# Patient Record
Sex: MALE | Race: WHITE | NOT HISPANIC OR LATINO
[De-identification: names, ages, dates, MRNs, and addresses within clinical notes are randomized per-mention and may not be internally consistent; named-entity substitution may affect disease eponyms.]

---

## 2017-06-14 ENCOUNTER — CHARTING TRANS (OUTPATIENT)
Dept: OTHER | Age: 72
End: 2017-06-14

## 2018-11-03 VITALS
HEART RATE: 75 BPM | TEMPERATURE: 98.3 F | SYSTOLIC BLOOD PRESSURE: 140 MMHG | DIASTOLIC BLOOD PRESSURE: 88 MMHG | WEIGHT: 190 LBS | RESPIRATION RATE: 16 BRPM | BODY MASS INDEX: 28.79 KG/M2 | HEIGHT: 68 IN

## 2019-02-26 ENCOUNTER — HOSPITAL (OUTPATIENT)
Dept: OTHER | Age: 74
End: 2019-02-26
Attending: INTERNAL MEDICINE

## 2019-03-04 ENCOUNTER — HOSPITAL ENCOUNTER (INPATIENT)
Facility: HOSPITAL | Age: 74
LOS: 5 days | Discharge: HOME | DRG: 683 | End: 2019-03-09
Attending: EMERGENCY MEDICINE | Admitting: HOSPITALIST
Payer: MEDICARE

## 2019-03-04 ENCOUNTER — APPOINTMENT (OUTPATIENT)
Dept: CT IMAGING | Facility: HOSPITAL | Age: 74
DRG: 683 | End: 2019-03-04
Attending: EMERGENCY MEDICINE
Payer: MEDICARE

## 2019-03-04 DIAGNOSIS — I10 HYPERTENSION, UNSPECIFIED TYPE: Primary | ICD-10-CM

## 2019-03-04 DIAGNOSIS — R33.9 URINARY RETENTION: ICD-10-CM

## 2019-03-04 DIAGNOSIS — R11.0 NAUSEA: ICD-10-CM

## 2019-03-04 DIAGNOSIS — N17.9 AKI (ACUTE KIDNEY INJURY) (HCC): ICD-10-CM

## 2019-03-04 DIAGNOSIS — E83.52 HYPERCALCEMIA: ICD-10-CM

## 2019-03-04 PROBLEM — N13.9 OBSTRUCTIVE UROPATHY: Status: ACTIVE | Noted: 2019-03-04

## 2019-03-04 LAB
ALBUMIN SERPL-MCNC: 3.8 G/DL (ref 3.4–5)
ALP LIVER SERPL-CCNC: 66 U/L (ref 45–117)
ALT SERPL-CCNC: 16 U/L (ref 16–61)
ANION GAP SERPL CALC-SCNC: 12 MMOL/L (ref 0–18)
AST SERPL-CCNC: 10 U/L (ref 15–37)
BACTERIA UR QL AUTO: NEGATIVE /HPF
BASOPHILS # BLD AUTO: 0.08 X10(3) UL (ref 0–0.2)
BASOPHILS NFR BLD AUTO: 0.7 %
BILIRUB DIRECT SERPL-MCNC: <0.1 MG/DL (ref 0–0.2)
BILIRUB SERPL-MCNC: 0.4 MG/DL (ref 0.1–2)
BILIRUB UR QL: NEGATIVE
BILIRUB UR QL: NEGATIVE
BUN BLD-MCNC: 84 MG/DL (ref 7–18)
BUN/CREAT SERPL: 11.3 (ref 10–20)
CALCIUM BLD-MCNC: 13.3 MG/DL (ref 8.5–10.1)
CHLORIDE SERPL-SCNC: 111 MMOL/L (ref 98–107)
CLARITY UR: CLEAR
CLARITY UR: CLEAR
CO2 SERPL-SCNC: 21 MMOL/L (ref 21–32)
COLOR UR: YELLOW
CREAT BLD-MCNC: 7.45 MG/DL (ref 0.7–1.3)
DEPRECATED RDW RBC AUTO: 45.6 FL (ref 35.1–46.3)
EOSINOPHIL # BLD AUTO: 0.09 X10(3) UL (ref 0–0.7)
EOSINOPHIL NFR BLD AUTO: 0.8 %
ERYTHROCYTE [DISTWIDTH] IN BLOOD BY AUTOMATED COUNT: 14 % (ref 11–15)
GLUCOSE BLD-MCNC: 118 MG/DL (ref 70–99)
GLUCOSE UR-MCNC: 50 MG/DL
GLUCOSE UR-MCNC: NEGATIVE MG/DL
HCT VFR BLD AUTO: 33.8 % (ref 39–53)
HGB BLD-MCNC: 11.6 G/DL (ref 13–17.5)
HGB UR QL STRIP.AUTO: NEGATIVE
IMM GRANULOCYTES # BLD AUTO: 0.04 X10(3) UL (ref 0–1)
IMM GRANULOCYTES NFR BLD: 0.4 %
KETONES UR-MCNC: NEGATIVE MG/DL
KETONES UR-MCNC: NEGATIVE MG/DL
LEUKOCYTE ESTERASE UR QL STRIP.AUTO: NEGATIVE
LEUKOCYTE ESTERASE UR QL STRIP.AUTO: NEGATIVE
LIPASE SERPL-CCNC: 359 U/L (ref 73–393)
LYMPHOCYTES # BLD AUTO: 1.44 X10(3) UL (ref 1–4)
LYMPHOCYTES NFR BLD AUTO: 13.3 %
M PROTEIN MFR SERPL ELPH: 8.3 G/DL (ref 6.4–8.2)
MCH RBC QN AUTO: 30.9 PG (ref 26–34)
MCHC RBC AUTO-ENTMCNC: 34.3 G/DL (ref 31–37)
MCV RBC AUTO: 90.1 FL (ref 80–100)
MONOCYTES # BLD AUTO: 0.85 X10(3) UL (ref 0.1–1)
MONOCYTES NFR BLD AUTO: 7.8 %
NEUTROPHILS # BLD AUTO: 8.34 X10 (3) UL (ref 1.5–7.7)
NEUTROPHILS # BLD AUTO: 8.34 X10(3) UL (ref 1.5–7.7)
NEUTROPHILS NFR BLD AUTO: 77 %
NITRITE UR QL STRIP.AUTO: NEGATIVE
NITRITE UR QL STRIP.AUTO: NEGATIVE
OSMOLALITY SERPL CALC.SUM OF ELEC: 325 MOSM/KG (ref 275–295)
PH UR: 5 [PH] (ref 5–8)
PH UR: 7 [PH] (ref 5–8)
PLATELET # BLD AUTO: 337 10(3)UL (ref 150–450)
POTASSIUM SERPL-SCNC: 4.6 MMOL/L (ref 3.5–5.1)
PROT UR-MCNC: 352.2 MG/DL
PROT UR-MCNC: >=500 MG/DL
PROT UR-MCNC: NEGATIVE MG/DL
RBC # BLD AUTO: 3.75 X10(6)UL (ref 3.8–5.8)
RBC #/AREA URNS AUTO: ABNORMAL /HPF
SODIUM SERPL-SCNC: 144 MMOL/L (ref 136–145)
SP GR UR STRIP: 1.01 (ref 1–1.03)
SP GR UR STRIP: 1.01 (ref 1–1.03)
UROBILINOGEN UR STRIP-ACNC: <2
UROBILINOGEN UR STRIP-ACNC: <2
VIT C UR-MCNC: NEGATIVE MG/DL
VIT C UR-MCNC: NEGATIVE MG/DL
WBC # BLD AUTO: 10.8 X10(3) UL (ref 4–11)
WBC #/AREA URNS AUTO: 0 /HPF

## 2019-03-04 PROCEDURE — 99223 1ST HOSP IP/OBS HIGH 75: CPT | Performed by: HOSPITALIST

## 2019-03-04 PROCEDURE — 74176 CT ABD & PELVIS W/O CONTRAST: CPT | Performed by: EMERGENCY MEDICINE

## 2019-03-04 RX ORDER — ONDANSETRON 2 MG/ML
4 INJECTION INTRAMUSCULAR; INTRAVENOUS EVERY 6 HOURS PRN
Status: DISCONTINUED | OUTPATIENT
Start: 2019-03-04 | End: 2019-03-09

## 2019-03-04 RX ORDER — ACETAMINOPHEN 325 MG/1
650 TABLET ORAL EVERY 6 HOURS PRN
Status: DISCONTINUED | OUTPATIENT
Start: 2019-03-04 | End: 2019-03-09

## 2019-03-04 RX ORDER — FINASTERIDE 5 MG/1
5 TABLET, FILM COATED ORAL DAILY
Status: DISCONTINUED | OUTPATIENT
Start: 2019-03-04 | End: 2019-03-09

## 2019-03-04 RX ORDER — AMLODIPINE BESYLATE 10 MG/1
10 TABLET ORAL DAILY
Status: DISCONTINUED | OUTPATIENT
Start: 2019-03-04 | End: 2019-03-09

## 2019-03-04 RX ORDER — CIPROFLOXACIN 2 MG/ML
400 INJECTION, SOLUTION INTRAVENOUS EVERY 12 HOURS
Status: DISCONTINUED | OUTPATIENT
Start: 2019-03-04 | End: 2019-03-04

## 2019-03-04 RX ORDER — SODIUM CHLORIDE 9 MG/ML
INJECTION, SOLUTION INTRAVENOUS CONTINUOUS
Status: DISCONTINUED | OUTPATIENT
Start: 2019-03-04 | End: 2019-03-05

## 2019-03-04 RX ORDER — AMLODIPINE BESYLATE 5 MG/1
5 TABLET ORAL DAILY
Status: DISCONTINUED | OUTPATIENT
Start: 2019-03-04 | End: 2019-03-04

## 2019-03-04 RX ORDER — SODIUM CHLORIDE 0.9 % (FLUSH) 0.9 %
3 SYRINGE (ML) INJECTION AS NEEDED
Status: DISCONTINUED | OUTPATIENT
Start: 2019-03-04 | End: 2019-03-09

## 2019-03-04 RX ORDER — SODIUM CHLORIDE 9 MG/ML
INJECTION, SOLUTION INTRAVENOUS CONTINUOUS
Status: DISCONTINUED | OUTPATIENT
Start: 2019-03-04 | End: 2019-03-09

## 2019-03-04 RX ORDER — TERAZOSIN 2 MG/1
2 CAPSULE ORAL NIGHTLY
Status: DISCONTINUED | OUTPATIENT
Start: 2019-03-04 | End: 2019-03-04

## 2019-03-04 RX ORDER — SODIUM CHLORIDE 9 MG/ML
INJECTION, SOLUTION INTRAVENOUS
Status: DISPENSED
Start: 2019-03-04 | End: 2019-03-05

## 2019-03-04 RX ORDER — LABETALOL HYDROCHLORIDE 5 MG/ML
10 INJECTION, SOLUTION INTRAVENOUS ONCE
Status: COMPLETED | OUTPATIENT
Start: 2019-03-04 | End: 2019-03-04

## 2019-03-04 RX ORDER — ONDANSETRON 2 MG/ML
4 INJECTION INTRAMUSCULAR; INTRAVENOUS ONCE
Status: COMPLETED | OUTPATIENT
Start: 2019-03-04 | End: 2019-03-04

## 2019-03-04 RX ORDER — PYRIDOXINE HCL (VITAMIN B6) 100 MG
TABLET ORAL
Status: ON HOLD | COMMUNITY
End: 2019-03-09

## 2019-03-04 RX ORDER — HYDRALAZINE HYDROCHLORIDE 20 MG/ML
10 INJECTION INTRAMUSCULAR; INTRAVENOUS ONCE
Status: COMPLETED | OUTPATIENT
Start: 2019-03-04 | End: 2019-03-04

## 2019-03-04 RX ORDER — ALFUZOSIN HYDROCHLORIDE 10 MG/1
10 TABLET, EXTENDED RELEASE ORAL
Status: DISCONTINUED | OUTPATIENT
Start: 2019-03-05 | End: 2019-03-07

## 2019-03-04 RX ORDER — HYDRALAZINE HYDROCHLORIDE 20 MG/ML
INJECTION INTRAMUSCULAR; INTRAVENOUS
Status: COMPLETED
Start: 2019-03-04 | End: 2019-03-04

## 2019-03-04 RX ORDER — HYDRALAZINE HYDROCHLORIDE 20 MG/ML
20 INJECTION INTRAMUSCULAR; INTRAVENOUS EVERY 6 HOURS PRN
Status: DISCONTINUED | OUTPATIENT
Start: 2019-03-04 | End: 2019-03-09

## 2019-03-04 RX ORDER — CIPROFLOXACIN 2 MG/ML
400 INJECTION, SOLUTION INTRAVENOUS EVERY 24 HOURS
Status: DISCONTINUED | OUTPATIENT
Start: 2019-03-04 | End: 2019-03-05

## 2019-03-04 RX ORDER — AMLODIPINE BESYLATE 5 MG/1
5 TABLET ORAL DAILY
Status: CANCELLED | OUTPATIENT
Start: 2019-03-04

## 2019-03-04 NOTE — CONSULTS
[facility] HOSP - [facility]    Report of Consultation    Marquis Sevilla Patient Status:  Emergency    1945 MRN [id number]   Location [address] Attending Tamica Yeh MD   Hosp Day # 0 PCP Ene Melgar     Date Summary (Last 24 hours) at 3/4/2019 1450  Last data filed at 3/4/2019 1405  Gross per 24 hour   Intake —   Output 1200 ml   Net -1200 ml     Wt Readings from Last 1 Encounters:  03/04/19 : 195 lb 1.7 oz (88.5 kg)      Exam  Gen: No acute distress  Heent: N at 13:47     Approved by (CST): Joseph Toribio MD on 3/04/2019 at 13:56                    Impression/Receommendations:     1 - YEMI  Due to NSAIDs and hydro  Also a component of hypercalcemia induced YEMI  Continue IVF    2 - Bilateral hydro  Dr Ginger Rothman cons

## 2019-03-04 NOTE — PROGRESS NOTES
In obtaining history from Mr. Marek Baez, he mentioned that he does drink 2 drinks/day, but states that he hasn't had any drinks in the last two weeks.  He also states that he does take some type of calcium supplement, on top of a multivitamin, but wasn't

## 2019-03-04 NOTE — ED INITIAL ASSESSMENT (HPI)
Patient here with c/o nausea and intermittent lower mid abdominal pain x 3 days. States recently had blood work done with elevated kidney function and US showing enlarged prostate.

## 2019-03-04 NOTE — PLAN OF CARE
GENITOURINARY - ADULT    • Absence of urinary retention Not Progressing          CARDIOVASCULAR - ADULT    • Maintains optimal cardiac output and hemodynamic stability Progressing        HEMATOLOGIC - ADULT    • Free from bleeding injury Progressing

## 2019-03-04 NOTE — H&P
[facility]    PATIENT'S NAME: Shelly Hernandez   ATTENDING PHYSICIAN: Tennille Leroy MD   PATIENT ACCOUNT#:   [id number]    LOCATION:  [place] [address]  MEDICAL RECORD #:   [id number]       YOB: 1945  ADMISSION DATE:       03/ today. Other 12-point review of system is negative. PHYSICAL EXAMINATION:    GENERAL:  Alert, oriented to time, place, and person. Moderate distress. VITAL SIGNS:  Temperature 98.6, pulse 83, respiratory rate 16, blood pressure 168/111.   Pulse ox

## 2019-03-04 NOTE — ED NOTES
Willard catheter clamped per dr Fredo Quach request. Megan Davies by dr Fredo Quach to keep clamped until patient is admitted to the floor.

## 2019-03-04 NOTE — PROGRESS NOTES
[facility] Pharmacy Note:  Renal Adjustment for ciprofloxacin (CIPRO)    Tyra Amaral is a 75year old male who has been prescribed ciprofloxacin (CIPRO) 400 mg every 12 hrs.   CrCl is estimated creatinine clearance is 8.1 mL/min (A) (based on SCr of 7.45 m

## 2019-03-05 LAB
ALBUMIN SERPL-MCNC: 3.2 G/DL (ref 3.4–5)
ANION GAP SERPL CALC-SCNC: 12 MMOL/L (ref 0–18)
BASOPHILS # BLD AUTO: 0.07 X10(3) UL (ref 0–0.2)
BASOPHILS NFR BLD AUTO: 0.5 %
BUN BLD-MCNC: 80 MG/DL (ref 7–18)
BUN/CREAT SERPL: 11.7 (ref 10–20)
CALCIUM BLD-MCNC: 11.3 MG/DL (ref 8.5–10.1)
CHLORIDE SERPL-SCNC: 112 MMOL/L (ref 98–107)
CO2 SERPL-SCNC: 19 MMOL/L (ref 21–32)
COMPLEXED PSA SERPL-MCNC: 182 NG/ML (ref ?–4)
CREAT BLD-MCNC: 6.81 MG/DL (ref 0.7–1.3)
DEPRECATED RDW RBC AUTO: 47.8 FL (ref 35.1–46.3)
EOSINOPHIL # BLD AUTO: 0.14 X10(3) UL (ref 0–0.7)
EOSINOPHIL NFR BLD AUTO: 1.1 %
ERYTHROCYTE [DISTWIDTH] IN BLOOD BY AUTOMATED COUNT: 14.1 % (ref 11–15)
GLUCOSE BLD-MCNC: 113 MG/DL (ref 70–99)
HCT VFR BLD AUTO: 32.8 % (ref 39–53)
HGB BLD-MCNC: 10.9 G/DL (ref 13–17.5)
IMM GRANULOCYTES # BLD AUTO: 0.04 X10(3) UL (ref 0–1)
IMM GRANULOCYTES NFR BLD: 0.3 %
LYMPHOCYTES # BLD AUTO: 2.01 X10(3) UL (ref 1–4)
LYMPHOCYTES NFR BLD AUTO: 15.4 %
MCH RBC QN AUTO: 30.4 PG (ref 26–34)
MCHC RBC AUTO-ENTMCNC: 33.2 G/DL (ref 31–37)
MCV RBC AUTO: 91.4 FL (ref 80–100)
MONOCYTES # BLD AUTO: 1.19 X10(3) UL (ref 0.1–1)
MONOCYTES NFR BLD AUTO: 9.1 %
MRSA DNA SPEC QL NAA+PROBE: NEGATIVE
NEUTROPHILS # BLD AUTO: 9.58 X10 (3) UL (ref 1.5–7.7)
NEUTROPHILS # BLD AUTO: 9.58 X10(3) UL (ref 1.5–7.7)
NEUTROPHILS NFR BLD AUTO: 73.6 %
OSMOLALITY SERPL CALC.SUM OF ELEC: 321 MOSM/KG (ref 275–295)
PHOSPHATE SERPL-MCNC: 6.3 MG/DL (ref 2.5–4.9)
PLATELET # BLD AUTO: 342 10(3)UL (ref 150–450)
POTASSIUM SERPL-SCNC: 4.6 MMOL/L (ref 3.5–5.1)
PTH-INTACT SERPL-MCNC: 10.4 PG/ML (ref 18.5–88)
RBC # BLD AUTO: 3.59 X10(6)UL (ref 3.8–5.8)
SODIUM SERPL-SCNC: 143 MMOL/L (ref 136–145)
TSI SER-ACNC: 2.03 MIU/ML (ref 0.36–3.74)
VIT B12 SERPL-MCNC: 775 PG/ML (ref 193–986)
WBC # BLD AUTO: 13 X10(3) UL (ref 4–11)

## 2019-03-05 PROCEDURE — 99233 SBSQ HOSP IP/OBS HIGH 50: CPT | Performed by: HOSPITALIST

## 2019-03-05 RX ORDER — SODIUM CHLORIDE 9 MG/ML
INJECTION, SOLUTION INTRAVENOUS
Status: DISPENSED
Start: 2019-03-05 | End: 2019-03-06

## 2019-03-05 RX ORDER — SIMETHICONE 80 MG
80 TABLET,CHEWABLE ORAL 4 TIMES DAILY PRN
Status: DISCONTINUED | OUTPATIENT
Start: 2019-03-05 | End: 2019-03-09

## 2019-03-05 RX ORDER — PRAVASTATIN SODIUM 20 MG
20 TABLET ORAL NIGHTLY
Status: DISCONTINUED | OUTPATIENT
Start: 2019-03-05 | End: 2019-03-09

## 2019-03-05 RX ORDER — HEPARIN SODIUM 5000 [USP'U]/ML
5000 INJECTION, SOLUTION INTRAVENOUS; SUBCUTANEOUS EVERY 12 HOURS SCHEDULED
Status: DISCONTINUED | OUTPATIENT
Start: 2019-03-05 | End: 2019-03-08

## 2019-03-05 RX ORDER — ASPIRIN 81 MG/1
81 TABLET, CHEWABLE ORAL DAILY
Status: DISCONTINUED | OUTPATIENT
Start: 2019-03-06 | End: 2019-03-09

## 2019-03-05 RX ORDER — POLYETHYLENE GLYCOL 3350 17 G/17G
17 POWDER, FOR SOLUTION ORAL DAILY
Status: DISCONTINUED | OUTPATIENT
Start: 2019-03-05 | End: 2019-03-07

## 2019-03-05 RX ORDER — PAMIDRONATE DISODIUM 3 MG/ML
60 INJECTION, SOLUTION INTRAVENOUS ONCE
Status: DISCONTINUED | OUTPATIENT
Start: 2019-03-05 | End: 2019-03-05 | Stop reason: SDUPTHER

## 2019-03-05 RX ORDER — SENNA AND DOCUSATE SODIUM 50; 8.6 MG/1; MG/1
2 TABLET, FILM COATED ORAL
Status: DISCONTINUED | OUTPATIENT
Start: 2019-03-05 | End: 2019-03-09

## 2019-03-05 NOTE — PLAN OF CARE
METABOLIC/FLUID AND ELECTROLYTES - ADULT    • Electrolytes maintained within normal limits Not Progressing          Altered Communication/Language Barrier    • Patient/Family is able to understand and participate in their care Progressing        CARDIOVASC

## 2019-03-05 NOTE — PROGRESS NOTES
[facility]    Progress Note    Chelsi Washington Patient Status:  Inpatient    1945 MRN [id number]   Location [facility] 2W/SW Attending Emmett Jaramillo MD   Hosp Day # 1 PCP Rosales Leader       Subjective:   Andrade TORRES [facility] Normal Saline Flush, acetaminophen, ondansetron HCl, hydrALAzine HCl    Results:     Lab Results   Component Value Date    WBC 13.0 (H) 03/05/2019    HGB 10.9 (L) 03/05/2019    HCT 32.8 (L) 03/05/2019    .0 03/05/2019    CREATSERUM 6.81 (H) 03/ symmetric-appearing bilateral hydroureteronephrosis, but without visible obstructing calculus. Hydronephrosis may be related to outlet obstruction/vesicoureteral reflux or ascending urinary tract infection. Urology assessment should be considered.  2. Pro

## 2019-03-05 NOTE — PROGRESS NOTES
Paged Dr. Schilling Crooked in regards to urine bed red with some clotting. Order given to flush with 100-400ml saline every 2 hours and to keep updated on improvement.

## 2019-03-05 NOTE — PROGRESS NOTES
Consult dictated, case discussed with medical nursing team. Thanks. Blaise Gallegos. Jennifer Arana MD, FACS.    Pager; [phone]  Cell: [phone]

## 2019-03-05 NOTE — PROGRESS NOTES
[facility]    Progress Note    Mila Gonzalez Patient Status:  Inpatient    1945 MRN [id number]   Location [facility] 2W/SW Attending Tara Ya MD   Hosp Day # 1 PCP Nori Valera       Subjective:   [facility] GFRAA  8*  8*   GFRNAA  7*  7*   CA  13.3*  11.3*   NA  144  143   K  4.6  4.6   CL  111*  112*   CO2  21.0  19.0*          Ct Abdomen+pelvis(cpt=74176)    Result Date: 3/4/2019  CONCLUSION:  1. Marked urinary bladder distention (bladder volume of approx

## 2019-03-05 NOTE — CONSULTS
[facility]    PATIENT'S NAME: Leah Madeleine   ATTENDING PHYSICIAN: Desi Hamilton MD   CONSULTING PHYSICIAN: Meryle Childes.  Evelyn Burns MD   PATIENT ACCOUNT#:   [id number]    LOCATION:  [address] [place] [address] RECORD #:   [id number]       DATE OF BIRTH: to have a circumcision to suggest that he has had it_______,      MEDICATIONS:  Reviewed. ALLERGIES:  No known drug allergies. FAMILY HISTORY:  His mother is still alive. Father  in his 80s. SOCIAL HISTORY:  Denies x3.     REVIEW OF SYSTEMS: prostate is about 80 mL, an estimated calculation. ASSESSMENT AND PLAN:    1. Acute on chronic obstructive uropathy with urinary retention. 2.     Secondary prostatomegaly.   The etiology of the prostatomegaly, likely has a neurogenic bladder possib daily, which can be started once the patient is completely ambulatory. Finasteride can be added at a later time. Discussed in great details with the nursing staff. We will follow with you. Thank you for allowing me to see this patient urologically.

## 2019-03-05 NOTE — PROGRESS NOTES
Dr. Shiv Huang saw the patient. This writer was instructed to flush patient huddleston with 200 cc. Will continue to monitor.

## 2019-03-06 LAB
1,25-DIHYDROXYVITAMIN D: 11.9 PG/ML
25(OH)D3 SERPL-MCNC: 41.7 NG/ML (ref 30–100)
ALBUMIN SERPL-MCNC: 3 G/DL (ref 3.4–5)
ANION GAP SERPL CALC-SCNC: 9 MMOL/L (ref 0–18)
BASOPHILS # BLD AUTO: 0.06 X10(3) UL (ref 0–0.2)
BASOPHILS NFR BLD AUTO: 0.5 %
BUN BLD-MCNC: 67 MG/DL (ref 7–18)
BUN/CREAT SERPL: 11.7 (ref 10–20)
CALCIUM BLD-MCNC: 10.2 MG/DL (ref 8.5–10.1)
CHLORIDE SERPL-SCNC: 117 MMOL/L (ref 98–107)
CO2 SERPL-SCNC: 21 MMOL/L (ref 21–32)
CREAT BLD-MCNC: 5.74 MG/DL (ref 0.7–1.3)
DEPRECATED RDW RBC AUTO: 47.6 FL (ref 35.1–46.3)
EOSINOPHIL # BLD AUTO: 0.15 X10(3) UL (ref 0–0.7)
EOSINOPHIL NFR BLD AUTO: 1.2 %
ERYTHROCYTE [DISTWIDTH] IN BLOOD BY AUTOMATED COUNT: 14.2 % (ref 11–15)
GLUCOSE BLD-MCNC: 112 MG/DL (ref 70–99)
HCT VFR BLD AUTO: 29.1 % (ref 39–53)
HGB BLD-MCNC: 9.6 G/DL (ref 13–17.5)
IMM GRANULOCYTES # BLD AUTO: 0.04 X10(3) UL (ref 0–1)
IMM GRANULOCYTES NFR BLD: 0.3 %
LYMPHOCYTES # BLD AUTO: 1.6 X10(3) UL (ref 1–4)
LYMPHOCYTES NFR BLD AUTO: 12.7 %
MCH RBC QN AUTO: 30.5 PG (ref 26–34)
MCHC RBC AUTO-ENTMCNC: 33 G/DL (ref 31–37)
MCV RBC AUTO: 92.4 FL (ref 80–100)
MONOCYTES # BLD AUTO: 1.15 X10(3) UL (ref 0.1–1)
MONOCYTES NFR BLD AUTO: 9.1 %
NEUTROPHILS # BLD AUTO: 9.61 X10 (3) UL (ref 1.5–7.7)
NEUTROPHILS # BLD AUTO: 9.61 X10(3) UL (ref 1.5–7.7)
NEUTROPHILS NFR BLD AUTO: 76.2 %
OSMOLALITY SERPL CALC.SUM OF ELEC: 324 MOSM/KG (ref 275–295)
PHOSPHATE SERPL-MCNC: 5.5 MG/DL (ref 2.5–4.9)
PLATELET # BLD AUTO: 294 10(3)UL (ref 150–450)
POTASSIUM SERPL-SCNC: 4.5 MMOL/L (ref 3.5–5.1)
RBC # BLD AUTO: 3.15 X10(6)UL (ref 3.8–5.8)
SODIUM SERPL-SCNC: 147 MMOL/L (ref 136–145)
WBC # BLD AUTO: 12.6 X10(3) UL (ref 4–11)

## 2019-03-06 PROCEDURE — 99233 SBSQ HOSP IP/OBS HIGH 50: CPT | Performed by: HOSPITALIST

## 2019-03-06 RX ORDER — PANTOPRAZOLE SODIUM 40 MG/1
40 TABLET, DELAYED RELEASE ORAL
Status: DISCONTINUED | OUTPATIENT
Start: 2019-03-06 | End: 2019-03-09

## 2019-03-06 RX ORDER — CARVEDILOL 6.25 MG/1
6.25 TABLET ORAL 2 TIMES DAILY WITH MEALS
Status: DISCONTINUED | OUTPATIENT
Start: 2019-03-06 | End: 2019-03-09

## 2019-03-06 RX ORDER — SODIUM CHLORIDE 9 MG/ML
INJECTION, SOLUTION INTRAVENOUS
Status: COMPLETED
Start: 2019-03-06 | End: 2019-03-06

## 2019-03-06 NOTE — RESPIRATORY THERAPY NOTE
DOREEN ASSESSMENT:    Pt does not have a previous diagnosis of DOREEN. Pt does not routinely use a CPAP device at home. This pt is suspected to be at high risk for DOREEN and sleep lab packet was provided to patient for outpatient follow-up.

## 2019-03-06 NOTE — PROGRESS NOTES
Urology  Progress Note    Ginette Hawkins Patient Status:  Inpatient    1945 MRN [id number]   Location [facility] 2W/SW Attending Lev Zheng MD   Hosp Day # 1 PCP Abdullahi Andino       Subjective:   Ginette Hawkins is a(n) 75 y Date    WBC 13.0 (H) 03/05/2019    HGB 10.9 (L) 03/05/2019    HCT 32.8 (L) 03/05/2019    .0 03/05/2019    CREATSERUM 6.81 (H) 03/05/2019    BUN 80 (H) 03/05/2019     03/05/2019    K 4.6 03/05/2019     (H) 03/05/2019    CO2 19.0 (L) 03/05

## 2019-03-06 NOTE — PROGRESS NOTES
[facility] - [facility]    Progress Note    Cliffton Notice Patient Status:  Inpatient    1945 MRN [id number]   Location [facility] 2W/SW Attending Fredi Abbasi MD   Hosp Day # 2 PCP Veverlenny Yee       Subjective:   Karen Torres [facility] 1239  03/05/19   0604  03/06/19   0429   GLU  118*  113*  112*   BUN  84*  80*  67*   CREATSERUM  7.45*  6.81*  5.74*   GFRAA  8*  8*  10*   GFRNAA  7*  7*  9*   CA  13.3*  11.3*  10.2*   NA  144  143  147*   K  4.6  4.6  4.5   CL  111*  112*  117*   CO2

## 2019-03-06 NOTE — PROGRESS NOTES
[facility]    Progress Note    Ely Clement Patient Status:  Inpatient    1945 MRN [id number]   Location [facility] 2W/SW Attending Christiano Fay MD   Hosp Day # 2 PCP Gregorio Tesfaye       Subjective:   Karina Soto [facility] [place]-Docusate Sodium, simethicone, Normal Saline Flush, acetaminophen, ondansetron HCl, hydrALAzine HCl    Results:     Lab Results   Component Value Date    WBC 12.6 (H) 03/06/2019    HGB 9.6 (L) 03/06/2019    HCT 29.1 (L) 03/06/2019    .0 03/06 and Plan:     Acute obstructive uropathy with acute renal failure  Prostatomegaly  -IVF  -trend renal fx which is improving  -Cont Willard with irrigation per Urology recs  -PSA high - plan cysto and TRUS w Bx  -renal following  -Urology following  -started

## 2019-03-06 NOTE — PAYOR COMM NOTE
--------------  ADMISSION REVIEW     Payor: MEDICARE ADVANTAGE PROGRAM (GENERAL)  Subscriber #:  [id number]  Authorization Number: [id number]    Admit date: 3/4/19  Admit time: 46       Admitting Physician: Lashon Herr MD  Attending Physician:  Maggie Patel and redness. Respiratory: Negative for cough, shortness of breath and wheezing. Cardiovascular: Negative for chest pain. Gastrointestinal: Positive for abdominal pain and nausea. Negative for diarrhea and vomiting.    Genitourinary: Negative for dysu No rash noted. He is not diaphoretic. Psychiatric: He has a normal mood and affect. Nursing note and vitals reviewed. ED Course     Pulse Oximeter:  Pulse oximetry on room air is 99%, indicating adequate oxygenation.     PROCEDURES:  none    DIAGNOST Collection Time: 03/04/19 12:39 PM   Result Value Ref Range    WBC 10.8 4.0 - 11.0 x10(3) uL    RBC 3.75 (L) 3.80 - 5.80 x10(6)uL    HGB 11.6 (L) 13.0 - 17.5 g/dL    HCT 33.8 (L) 39.0 - 53.0 %    MCV 90.1 80.0 - 100.0 fL    MCH 30.9 26.0 - 34.0 pg    MCHC incidental findings as above. EMERGENCY DEPARTMENT COURSE AND TREATMENT:  Patient's condition was satble during Emergency Department evaluation.      73yoM with abdominal pain, nausea  - I personally reviewed and interpreted all the ED vitals  - afebr including hypertensive emergency, abdominal tumor, urinary retention.     Critical Care Time:  Critical care time for this patient was in obtaining history, performing a physical exam, bedside monitoring of interventions, collecting and interpreting test, a Only takes cholesterol medications per his report. ALLERGIES:  No known drug allergies. FAMILY HISTORY:  Mother still alive. Father  in his 80s.       REVIEW OF SYSTEMS:  Patient said for the last 2 to 3 months has been having difficulty urinati Continue to monitor his kidney function closely. Also, we will cover with IV Cipro and obtain blood cultures. Further recommendations to follow.   Kristal Dhillon MD  03/04/2019 14:24:23    Report of Consultation, NEPHROLOGY  Reason for Consultation:   Hy Recommendation to look for etiologies and repeat daily BMPs to see whether hopefully the calcium will trend down. 3.       Gross hematuria: This is probably due to the phenomenon of distention and release of the retention.   This should subside with shabana Date Action Dose Route User    3/6/2019 0839 Given 10 mg Oral Theo , RN      aspirin chewable tab 81 mg     Date Action Dose Route User    3/6/2019 0839 Given 81 mg Oral Theoemerita Vilchis, RN      finasteride (PROSCAR) tab 5

## 2019-03-06 NOTE — PLAN OF CARE
Altered Communication/Language Barrier    • Patient/Family is able to understand and participate in their care Progressing        CARDIOVASCULAR - ADULT    • Maintains optimal cardiac output and hemodynamic stability Progressing        GENITOURINARY - ADUL

## 2019-03-07 ENCOUNTER — ANESTHESIA EVENT (OUTPATIENT)
Dept: SURGERY | Facility: HOSPITAL | Age: 74
DRG: 683 | End: 2019-03-07
Payer: MEDICARE

## 2019-03-07 ENCOUNTER — APPOINTMENT (OUTPATIENT)
Dept: GENERAL RADIOLOGY | Facility: HOSPITAL | Age: 74
DRG: 683 | End: 2019-03-07
Attending: UROLOGY
Payer: MEDICARE

## 2019-03-07 ENCOUNTER — APPOINTMENT (OUTPATIENT)
Dept: ULTRASOUND IMAGING | Facility: HOSPITAL | Age: 74
DRG: 683 | End: 2019-03-07
Attending: UROLOGY
Payer: MEDICARE

## 2019-03-07 ENCOUNTER — ANESTHESIA (OUTPATIENT)
Dept: SURGERY | Facility: HOSPITAL | Age: 74
DRG: 683 | End: 2019-03-07
Payer: MEDICARE

## 2019-03-07 LAB
ALBUMIN SERPL-MCNC: 3.1 G/DL (ref 3.4–5)
ANION GAP SERPL CALC-SCNC: 9 MMOL/L (ref 0–18)
ANTIBODY SCREEN: NEGATIVE
BASOPHILS # BLD AUTO: 0.07 X10(3) UL (ref 0–0.2)
BASOPHILS NFR BLD AUTO: 0.7 %
BUN BLD-MCNC: 56 MG/DL (ref 7–18)
BUN/CREAT SERPL: 12.2 (ref 10–20)
CALCIUM BLD-MCNC: 9.6 MG/DL (ref 8.5–10.1)
CHLORIDE SERPL-SCNC: 117 MMOL/L (ref 98–107)
CO2 SERPL-SCNC: 20 MMOL/L (ref 21–32)
CREAT BLD-MCNC: 4.59 MG/DL (ref 0.7–1.3)
DEPRECATED HBV CORE AB SER IA-ACNC: 182.2 NG/ML (ref 30–530)
DEPRECATED RDW RBC AUTO: 48 FL (ref 35.1–46.3)
EOSINOPHIL # BLD AUTO: 0.37 X10(3) UL (ref 0–0.7)
EOSINOPHIL NFR BLD AUTO: 3.6 %
ERYTHROCYTE [DISTWIDTH] IN BLOOD BY AUTOMATED COUNT: 14.2 % (ref 11–15)
GLUCOSE BLD-MCNC: 104 MG/DL (ref 70–99)
GLUCOSE BLDC GLUCOMTR-MCNC: 94 MG/DL (ref 70–99)
HCT VFR BLD AUTO: 29.4 % (ref 39–53)
HGB BLD-MCNC: 9.6 G/DL (ref 13–17.5)
IMM GRANULOCYTES # BLD AUTO: 0.04 X10(3) UL (ref 0–1)
IMM GRANULOCYTES NFR BLD: 0.4 %
IRON SATURATION: 19 % (ref 20–50)
IRON SERPL-MCNC: 48 UG/DL (ref 65–175)
LYMPHOCYTES # BLD AUTO: 1.66 X10(3) UL (ref 1–4)
LYMPHOCYTES NFR BLD AUTO: 16.1 %
MCH RBC QN AUTO: 30.5 PG (ref 26–34)
MCHC RBC AUTO-ENTMCNC: 32.7 G/DL (ref 31–37)
MCV RBC AUTO: 93.3 FL (ref 80–100)
MONOCYTES # BLD AUTO: 0.95 X10(3) UL (ref 0.1–1)
MONOCYTES NFR BLD AUTO: 9.2 %
NEUTROPHILS # BLD AUTO: 7.19 X10 (3) UL (ref 1.5–7.7)
NEUTROPHILS # BLD AUTO: 7.19 X10(3) UL (ref 1.5–7.7)
NEUTROPHILS NFR BLD AUTO: 70 %
OSMOLALITY SERPL CALC.SUM OF ELEC: 318 MOSM/KG (ref 275–295)
PHOSPHATE SERPL-MCNC: 4.5 MG/DL (ref 2.5–4.9)
PLATELET # BLD AUTO: 290 10(3)UL (ref 150–450)
POTASSIUM SERPL-SCNC: 4.1 MMOL/L (ref 3.5–5.1)
RBC # BLD AUTO: 3.15 X10(6)UL (ref 3.8–5.8)
RH BLOOD TYPE: NEGATIVE
SODIUM SERPL-SCNC: 146 MMOL/L (ref 136–145)
TOTAL IRON BINDING CAPACITY: 249 UG/DL (ref 240–450)
TRANSFERRIN SERPL-MCNC: 167 MG/DL (ref 200–360)
WBC # BLD AUTO: 10.3 X10(3) UL (ref 4–11)

## 2019-03-07 PROCEDURE — 74420 UROGRAPHY RTRGR +-KUB: CPT | Performed by: UROLOGY

## 2019-03-07 PROCEDURE — BT1D1ZZ FLUOROSCOPY OF RIGHT KIDNEY, URETER AND BLADDER USING LOW OSMOLAR CONTRAST: ICD-10-PCS | Performed by: UROLOGY

## 2019-03-07 PROCEDURE — 0VB07ZX EXCISION OF PROSTATE, VIA NATURAL OR ARTIFICIAL OPENING, DIAGNOSTIC: ICD-10-PCS | Performed by: UROLOGY

## 2019-03-07 PROCEDURE — 99233 SBSQ HOSP IP/OBS HIGH 50: CPT | Performed by: HOSPITALIST

## 2019-03-07 PROCEDURE — 55700 US BIOPSY PROSTATE (CPT=76942/55700): CPT | Performed by: UROLOGY

## 2019-03-07 PROCEDURE — 76942 ECHO GUIDE FOR BIOPSY: CPT | Performed by: UROLOGY

## 2019-03-07 RX ORDER — DEXAMETHASONE SODIUM PHOSPHATE 4 MG/ML
VIAL (ML) INJECTION AS NEEDED
Status: DISCONTINUED | OUTPATIENT
Start: 2019-03-07 | End: 2019-03-07 | Stop reason: SURG

## 2019-03-07 RX ORDER — ONDANSETRON 2 MG/ML
4 INJECTION INTRAMUSCULAR; INTRAVENOUS ONCE AS NEEDED
Status: DISCONTINUED | OUTPATIENT
Start: 2019-03-07 | End: 2019-03-07 | Stop reason: HOSPADM

## 2019-03-07 RX ORDER — MORPHINE SULFATE 10 MG/ML
6 INJECTION, SOLUTION INTRAMUSCULAR; INTRAVENOUS EVERY 10 MIN PRN
Status: DISCONTINUED | OUTPATIENT
Start: 2019-03-07 | End: 2019-03-07 | Stop reason: HOSPADM

## 2019-03-07 RX ORDER — HALOPERIDOL 5 MG/ML
0.25 INJECTION INTRAMUSCULAR ONCE AS NEEDED
Status: DISCONTINUED | OUTPATIENT
Start: 2019-03-07 | End: 2019-03-07 | Stop reason: HOSPADM

## 2019-03-07 RX ORDER — HYDROCODONE BITARTRATE AND ACETAMINOPHEN 5; 325 MG/1; MG/1
1 TABLET ORAL AS NEEDED
Status: DISCONTINUED | OUTPATIENT
Start: 2019-03-07 | End: 2019-03-07 | Stop reason: HOSPADM

## 2019-03-07 RX ORDER — MORPHINE SULFATE 4 MG/ML
4 INJECTION, SOLUTION INTRAMUSCULAR; INTRAVENOUS EVERY 10 MIN PRN
Status: DISCONTINUED | OUTPATIENT
Start: 2019-03-07 | End: 2019-03-07 | Stop reason: HOSPADM

## 2019-03-07 RX ORDER — HYDROCODONE BITARTRATE AND ACETAMINOPHEN 5; 325 MG/1; MG/1
2 TABLET ORAL AS NEEDED
Status: DISCONTINUED | OUTPATIENT
Start: 2019-03-07 | End: 2019-03-07 | Stop reason: HOSPADM

## 2019-03-07 RX ORDER — SODIUM CHLORIDE, SODIUM LACTATE, POTASSIUM CHLORIDE, CALCIUM CHLORIDE 600; 310; 30; 20 MG/100ML; MG/100ML; MG/100ML; MG/100ML
INJECTION, SOLUTION INTRAVENOUS CONTINUOUS
Status: DISCONTINUED | OUTPATIENT
Start: 2019-03-07 | End: 2019-03-07 | Stop reason: HOSPADM

## 2019-03-07 RX ORDER — SODIUM CHLORIDE 0.9 % (FLUSH) 0.9 %
10 SYRINGE (ML) INJECTION AS NEEDED
Status: DISCONTINUED | OUTPATIENT
Start: 2019-03-07 | End: 2019-03-09

## 2019-03-07 RX ORDER — NALOXONE HYDROCHLORIDE 0.4 MG/ML
80 INJECTION, SOLUTION INTRAMUSCULAR; INTRAVENOUS; SUBCUTANEOUS AS NEEDED
Status: DISCONTINUED | OUTPATIENT
Start: 2019-03-07 | End: 2019-03-07 | Stop reason: HOSPADM

## 2019-03-07 RX ORDER — EPHEDRINE SULFATE 50 MG/ML
INJECTION, SOLUTION INTRAVENOUS AS NEEDED
Status: DISCONTINUED | OUTPATIENT
Start: 2019-03-07 | End: 2019-03-07 | Stop reason: SURG

## 2019-03-07 RX ORDER — MORPHINE SULFATE 2 MG/ML
2 INJECTION, SOLUTION INTRAMUSCULAR; INTRAVENOUS EVERY 10 MIN PRN
Status: DISCONTINUED | OUTPATIENT
Start: 2019-03-07 | End: 2019-03-07 | Stop reason: HOSPADM

## 2019-03-07 RX ORDER — SODIUM CHLORIDE 9 MG/ML
INJECTION, SOLUTION INTRAVENOUS
Status: COMPLETED
Start: 2019-03-07 | End: 2019-03-07

## 2019-03-07 RX ORDER — LIDOCAINE HYDROCHLORIDE 10 MG/ML
INJECTION, SOLUTION EPIDURAL; INFILTRATION; INTRACAUDAL; PERINEURAL AS NEEDED
Status: DISCONTINUED | OUTPATIENT
Start: 2019-03-07 | End: 2019-03-07 | Stop reason: SURG

## 2019-03-07 RX ORDER — CEFAZOLIN SODIUM/WATER 2 G/20 ML
SYRINGE (ML) INTRAVENOUS AS NEEDED
Status: DISCONTINUED | OUTPATIENT
Start: 2019-03-07 | End: 2019-03-07 | Stop reason: SURG

## 2019-03-07 RX ADMIN — EPHEDRINE SULFATE 10 MG: 50 INJECTION, SOLUTION INTRAVENOUS at 08:06:00

## 2019-03-07 RX ADMIN — EPHEDRINE SULFATE 10 MG: 50 INJECTION, SOLUTION INTRAVENOUS at 07:53:00

## 2019-03-07 RX ADMIN — DEXAMETHASONE SODIUM PHOSPHATE 4 MG: 4 MG/ML VIAL (ML) INJECTION at 07:36:00

## 2019-03-07 RX ADMIN — CEFAZOLIN SODIUM/WATER 2 G: 2 G/20 ML SYRINGE (ML) INTRAVENOUS at 07:45:00

## 2019-03-07 RX ADMIN — LIDOCAINE HYDROCHLORIDE 25 MG: 10 INJECTION, SOLUTION EPIDURAL; INFILTRATION; INTRACAUDAL; PERINEURAL at 07:36:00

## 2019-03-07 RX ADMIN — SODIUM CHLORIDE: 9 INJECTION, SOLUTION INTRAVENOUS at 07:31:00

## 2019-03-07 RX ADMIN — ONDANSETRON 4 MG: 2 INJECTION INTRAMUSCULAR; INTRAVENOUS at 07:36:00

## 2019-03-07 RX ADMIN — SODIUM CHLORIDE: 9 INJECTION, SOLUTION INTRAVENOUS at 08:26:00

## 2019-03-07 NOTE — PAYOR COMM NOTE
--------------  CONTINUED STAY REVIEW    Payor: MEDICARE ADVANTAGE PROGRAM (GENERAL)  Subscriber #:  [id number]  Authorization Number: [id number]    Admit date: 3/4/19  Admit time: 46    Admitting Physician: Grant Wright MD  Attending Physician:  Bette Newell Komal Mejia CRNA      finasteride (PROSCAR) tab 5 mg     Date Action Dose Route User    3/7/2019 1006 Given 5 mg Oral Cyn Rose, RN      Heparin Sodium (Porcine) 5000 UNIT/ML injection 5,000 Units     Date Action Dose Route User    3/7/2019 Cystourethroscopy, right retrograde ureteropyelography with contrast injection and fluoroscopy. 2.       Transrectal ultrasound of the prostate with prostate biopsies, total of 14 submitted for permanent pathologic evaluation.   Willard catheter was replaced

## 2019-03-07 NOTE — PROGRESS NOTES
[facility] - [facility]    Progress Note    Fredi Gray Patient Status:  Inpatient    1945 MRN [id number]   Location [facility] 2W/SW Attending Bill Mark MD   Hosp Day # 3 PCP Elle Bates       Subjective:   Summer 298 Pyelogram (cpt=74420)    Result Date: 3/7/2019  CONCLUSION:  1. Limited opacification of the distal right ureter as discussed.     Dictated by (CST): Di Bustillo MD on 3/07/2019 at 9:03     Approved by (CST): Di Bustillo MD on 3/07/2019 at 9:08

## 2019-03-07 NOTE — PLAN OF CARE
- Ambulated in hallway, up to chair  - IVF maintained  - Willard flushed PRN; urine remains cherry in color with small clots  - poor appetite  - has episodes of shaking/tremors; Afebrile, states he is \"cold. \"  Also appears anxious at times.   Consumes 2 gl

## 2019-03-07 NOTE — ANESTHESIA PROCEDURE NOTES
ANESTHESIA INTUBATION  Date/Time: 3/7/2019 7:37 AM  Urgency: elective    Airway not difficult    General Information and Staff    Patient location during procedure: OR  Anesthesiologist: Vadim Maharaj MD  Resident/CRNA: JANIYA Boo

## 2019-03-07 NOTE — BRIEF OP NOTE
Pre-Operative Diagnosis: elevated PSA; urinary retention, BPH, symptomatic, hematuria      Post-Operative Diagnosis:Same; very enlarged prostate at 132 cc in size , no active bleeding w/i the prostate. severe trabeculations indicative of long standing CROCKETT.

## 2019-03-07 NOTE — PROGRESS NOTES
Spoke extensively with Jamie Castro, patient's wife, and of course his PCP/hospitalist Dr. Dinora Medellin, yesterday and patient and wife very much like the procedure done today as we planned yesterday to do the Cystoscopy, bilateral retrogrades, possible bladder biop

## 2019-03-07 NOTE — OPERATIVE REPORT
[facility]    PATIENT'S NAME: Shelly Hernandez   ATTENDING PHYSICIAN: Amadou Sinclair MD   OPERATING PHYSICIAN: Marc Bishop.  Jaclyn Nice MD   PATIENT ACCOUNT#:   [id number]    LOCATION:  [address]  MEDICAL RECORD #:   [id number] for which a Willard catheter was placed. Eventually, continuous bladder irrigation as he developed hematuria. Creatinine was extremely elevated in the range of 7. He was observed as he had hypercalcemia.   Since malignancy was of concern, we recommended a etc.  At this time, focus was on performing the prostate biopsies. So, the scope was removed and transrectal ultrasonography was performed. The prostate measured to be 132 mL in size compatible with a very large prostate with calcifications as well.   Jimmie Rg

## 2019-03-07 NOTE — ANESTHESIA POSTPROCEDURE EVALUATION
Patient: Carolyne Childers    Procedure Summary     Date:  03/07/19 Room / Location:  [facility] OR  / [facility] OR    Anesthesia Start:  2809 Anesthesia Stop:      Procedure:  CYSTOSCOPY RETROGRADE (N/A ) Diagnosis:  (elevated PSA)    Surgeon:  Yeny Russell

## 2019-03-07 NOTE — ANESTHESIA PREPROCEDURE EVALUATION
Anesthesia PreOp Note    HPI:     Radha Landrum is a 75year old male who presents for preoperative consultation requested by: Angelia Jimenez MD    Date of Surgery: 3/4/2019 - 3/7/2019    Procedure(s):  CYSTOSCOPY RETROGRADE  Indication: elevated PSA Current Facility-Administered Medications Ordered in Epic:  carvedilol (COREG) tab 6.25 mg 6.25 mg Oral BID with meals Grace Devi MD 6.25 mg at 03/06/19 2048    Pantoprazole Sodium (PROTONIX) EC tab 40 mg 40 mg Oral BID AC Skeeter Jeans, MD History    Socioeconomic History      Marital status:       Spouse name: Not on file      Number of children: Not on file      Years of education: Not on file      Highest education level: Not on file    Occupational History      Not on file    Soci Narrative      Not on file      Available pre-op labs reviewed.   Lab Results   Component Value Date    WBC 10.3 03/07/2019    RBC 3.15 (L) 03/07/2019    HGB 9.6 (L) 03/07/2019    HCT 29.4 (L) 03/07/2019    MCV 93.3 03/07/2019    MCH 30.5 03/07/2019    [facility] complications, and any alternative forms of anesthetic management. All of the patient's questions were answered to the best of my ability. The patient desires the anesthetic management as planned.   ARJUN REYNOSO  3/7/2019 6:59 AM

## 2019-03-07 NOTE — PLAN OF CARE
Patient received from PACU this AM around 10 o'clock. Patient on CBI. Going slow. Urine output is pink/clear/very light. No clots noted. Page sent to Dr. Miesha Ramirez at 1600 to inquire about d/c CBI. Patient denies pain. Tolerating diet.  Wife at bedside, involve for signs of decreased coronary artery perfusion - ex.  Angina  - Evaluate fluid balance, assess for edema, trend weights  Outcome: Progressing      Problem: GENITOURINARY - ADULT  Goal: Absence of urinary retention  INTERVENTIONS:  - Assess patient’s abili indicated by assessment.  - Educate pt/family on patient safety including physical limitations  - Instruct pt to call for assistance with activity based on assessment  - Modify environment to reduce risk of injury  - Provide assistive devices as appropriat

## 2019-03-07 NOTE — PLAN OF CARE
Problem: Patient Centered Care  Goal: Patient preferences are identified and integrated in the patient's plan of care  Interventions:  - What would you like us to know as we care for you? Keep updated on what is going on and family too.   - Provide timely, retention protocol/standard of care  - Consider collaborating with pharmacy to review patient's medication profile  - Implement strategies to promote bladder emptying  Outcome: Progressing  Pt with huddleston. Has bloody urine with few blood clots.  To flush PRN pain and evaluate response  - Implement non-pharmacological measures as appropriate and evaluate response  - Consider cultural and social influences on pain and pain management  - Manage/alleviate anxiety  - Utilize distraction and/or relaxation techniques

## 2019-03-07 NOTE — PROGRESS NOTES
[facility]    Progress Note    Winnie Quiroz Patient Status:  Inpatient    1945 MRN [id number]   Location [facility] 2W/SW Attending Annie Anderson MD   Hosp Day # 3 PCP Ivan Olmedo       Subjective:   Summer 298 Saline Flush, acetaminophen, ondansetron HCl, hydrALAzine HCl    Results:     Lab Results   Component Value Date    WBC 10.3 03/07/2019    HGB 9.6 (L) 03/07/2019    HCT 29.4 (L) 03/07/2019    .0 03/07/2019    CREATSERUM 4.59 (H) 03/07/2019    BUN 56 18-24 hrs. N/A       Imaging/EKG:   Xr Retrograde Pyelogram (cpt=74420)    Result Date: 3/7/2019  CONCLUSION:  1. Limited opacification of the distal right ureter as discussed.     Dictated by (CST): Patrice Schwab MD on 3/07/2019 at 9:03     Approved by (C

## 2019-03-08 LAB
ALBUMIN SERPL-MCNC: 2.7 G/DL (ref 3.4–5)
ANION GAP SERPL CALC-SCNC: 6 MMOL/L (ref 0–18)
BASOPHILS # BLD AUTO: 0.07 X10(3) UL (ref 0–0.2)
BASOPHILS NFR BLD AUTO: 0.6 %
BUN BLD-MCNC: 43 MG/DL (ref 7–18)
BUN/CREAT SERPL: 11.6 (ref 10–20)
CALCIUM BLD-MCNC: 8.4 MG/DL (ref 8.5–10.1)
CHLORIDE SERPL-SCNC: 118 MMOL/L (ref 98–107)
CO2 SERPL-SCNC: 21 MMOL/L (ref 21–32)
CREAT BLD-MCNC: 3.71 MG/DL (ref 0.7–1.3)
DEPRECATED RDW RBC AUTO: 49.3 FL (ref 35.1–46.3)
EOSINOPHIL # BLD AUTO: 0.26 X10(3) UL (ref 0–0.7)
EOSINOPHIL NFR BLD AUTO: 2.1 %
ERYTHROCYTE [DISTWIDTH] IN BLOOD BY AUTOMATED COUNT: 14.1 % (ref 11–15)
GLUCOSE BLD-MCNC: 93 MG/DL (ref 70–99)
HCT VFR BLD AUTO: 28.7 % (ref 39–53)
HGB BLD-MCNC: 9.1 G/DL (ref 13–17.5)
IMM GRANULOCYTES # BLD AUTO: 0.05 X10(3) UL (ref 0–1)
IMM GRANULOCYTES NFR BLD: 0.4 %
LYMPHOCYTES # BLD AUTO: 1.97 X10(3) UL (ref 1–4)
LYMPHOCYTES NFR BLD AUTO: 15.8 %
MCH RBC QN AUTO: 30.3 PG (ref 26–34)
MCHC RBC AUTO-ENTMCNC: 31.7 G/DL (ref 31–37)
MCV RBC AUTO: 95.7 FL (ref 80–100)
MONOCYTES # BLD AUTO: 1.23 X10(3) UL (ref 0.1–1)
MONOCYTES NFR BLD AUTO: 9.9 %
NEUTROPHILS # BLD AUTO: 8.87 X10 (3) UL (ref 1.5–7.7)
NEUTROPHILS # BLD AUTO: 8.87 X10(3) UL (ref 1.5–7.7)
NEUTROPHILS NFR BLD AUTO: 71.2 %
OSMOLALITY SERPL CALC.SUM OF ELEC: 311 MOSM/KG (ref 275–295)
PHOSPHATE SERPL-MCNC: 3.4 MG/DL (ref 2.5–4.9)
PLATELET # BLD AUTO: 283 10(3)UL (ref 150–450)
POTASSIUM SERPL-SCNC: 3.8 MMOL/L (ref 3.5–5.1)
RBC # BLD AUTO: 3 X10(6)UL (ref 3.8–5.8)
SODIUM SERPL-SCNC: 145 MMOL/L (ref 136–145)
WBC # BLD AUTO: 12.5 X10(3) UL (ref 4–11)

## 2019-03-08 PROCEDURE — 99233 SBSQ HOSP IP/OBS HIGH 50: CPT | Performed by: HOSPITALIST

## 2019-03-08 RX ORDER — ALFUZOSIN HYDROCHLORIDE 10 MG/1
10 TABLET, EXTENDED RELEASE ORAL NIGHTLY
Status: DISCONTINUED | OUTPATIENT
Start: 2019-03-08 | End: 2019-03-09

## 2019-03-08 RX ORDER — SODIUM CHLORIDE 9 MG/ML
INJECTION, SOLUTION INTRAVENOUS
Status: DISPENSED
Start: 2019-03-08 | End: 2019-03-08

## 2019-03-08 NOTE — PROGRESS NOTES
[facility]    Progress Note    Steven Mosqueda Patient Status:  Inpatient    1945 MRN [id number]   Location [facility] 2W/SW Attending Tashi Jara MD   Hosp Day # 4 PCP Abhi Cyr       Subjective:     Feeling o 12.5 (H) 03/08/2019    HGB 9.1 (L) 03/08/2019    HCT 28.7 (L) 03/08/2019    .0 03/08/2019    CREATSERUM 3.71 (H) 03/08/2019    BUN 43 (H) 03/08/2019     03/08/2019    K 3.8 03/08/2019     (H) 03/08/2019    CO2 21.0 03/08/2019    GLU 93 0

## 2019-03-08 NOTE — PLAN OF CARE
CBI has been clamped since midnight. Urine is clear/red/small strands of blood noted. Patient denies pain. Up in chair. Walking halls. Tolerating renal diet. Okay to transfer to remote tele. Page sent to Dr. Kirill Paige to inquire about CBI / plan of care.  Will Evaluate fluid balance, assess for edema, trend weights  Outcome: Progressing      Problem: GENITOURINARY - ADULT  Goal: Absence of urinary retention  INTERVENTIONS:  - Assess patient’s ability to void and empty bladder  - Monitor intake/output and perform including physical limitations  - Instruct pt to call for assistance with activity based on assessment  - Modify environment to reduce risk of injury  - Provide assistive devices as appropriate  - Consider OT/PT consult to assist with strengthening/mobilit

## 2019-03-08 NOTE — PROGRESS NOTES
[facility]    Progress Note    Saud Sullivan Patient Status:  Inpatient    1945 MRN [id number]   Location [facility] 2W/SW Attending Mike Irwin MD   Hosp Day # 4 PCP Roman Burns       Subjective:   Eunice Bianchi GFRAA  10*  14*  17*   GFRNAA  9*  12*  15*   CA  10.2*  9.6  8.4*   NA  147*  146*  145   K  4.5  4.1  3.8   CL  117*  117*  118*   CO2  21.0  20.0*  21.0          Xr Retrograde Pyelogram (cpt=74420)    Result Date: 3/7/2019  CONCLUSION:  1.  Limited opa

## 2019-03-08 NOTE — PLAN OF CARE
Double RN skin check done prior to transfer off Unit. Skin check performed by this RN and jessica hernandez rn. Wounds are as follows: skin is intact. Blanchable redness to sacrum    Will remain available for any further questions or concerns.

## 2019-03-08 NOTE — OCCUPATIONAL THERAPY NOTE
OCCUPATIONAL THERAPY EVALUATION - INPATIENT     Room Number: 094/499-G  Evaluation Date: 3/8/2019  Type of Evaluation: Initial  Presenting Problem: (obstructive uropathy)    Physician Order: IP Consult to Occupational Therapy  Reason for Therapy: ADL/IADL training; Endurance training;Patient/Family education;Patient/Family training;Equipment eval/education       OCCUPATIONAL THERAPY MEDICAL/SOCIAL HISTORY     Problem List  Principal Problem:    Hypertension, unspecified type  Active Problems:    Hypertension Putting on and taking off regular upper body clothing?: None  -   Taking care of personal grooming such as brushing teeth?: None  -   Eating meals?: None    AM-PAC Score:  Score: 22  Approx Degree of Impairment: 25.8%  Standardized Score (AM-PAC Scale): 47

## 2019-03-08 NOTE — PLAN OF CARE
Problem: CARDIOVASCULAR - ADULT  Goal: Maintains optimal cardiac output and hemodynamic stability  INTERVENTIONS:  - Monitor vital signs, rhythm, and trends  - Monitor for bleeding, hypotension and signs of decreased cardiac output  - Evaluate effectivenes safe mobilization of patient  - Hold pressure on venipuncture sites to achieve adequate hemostasis  - Assess for signs and symptoms of internal bleeding  - Monitor lab trends  Outcome: Progressing  Pt with a 3-way huddleston catheter for CBI, currently clamped, in bed for now to prevent fall and is agreeable with plan.

## 2019-03-08 NOTE — CM/SW NOTE
Care Coordination Note    Progression of Care: Hospital Day # 4  Saw pt at bedside to assess his progress and discharge planning needs. Pt is sitting up in a chair, Alert, awake,orientated X 4,denies any c/o now; awaiting transfer to the floor today. CBI cl

## 2019-03-09 VITALS
HEART RATE: 77 BPM | OXYGEN SATURATION: 99 % | TEMPERATURE: 97 F | SYSTOLIC BLOOD PRESSURE: 134 MMHG | WEIGHT: 184.69 LBS | DIASTOLIC BLOOD PRESSURE: 79 MMHG | RESPIRATION RATE: 20 BRPM | BODY MASS INDEX: 29 KG/M2

## 2019-03-09 LAB
ALBUMIN SERPL-MCNC: 2.9 G/DL (ref 3.4–5)
ANION GAP SERPL CALC-SCNC: 9 MMOL/L (ref 0–18)
BASOPHILS # BLD AUTO: 0.08 X10(3) UL (ref 0–0.2)
BASOPHILS NFR BLD AUTO: 0.8 %
BUN BLD-MCNC: 40 MG/DL (ref 7–18)
BUN/CREAT SERPL: 12 (ref 10–20)
CALCIUM BLD-MCNC: 8.3 MG/DL (ref 8.5–10.1)
CHLORIDE SERPL-SCNC: 116 MMOL/L (ref 98–107)
CO2 SERPL-SCNC: 20 MMOL/L (ref 21–32)
CREAT BLD-MCNC: 3.34 MG/DL (ref 0.7–1.3)
DEPRECATED RDW RBC AUTO: 48 FL (ref 35.1–46.3)
EOSINOPHIL # BLD AUTO: 0.28 X10(3) UL (ref 0–0.7)
EOSINOPHIL NFR BLD AUTO: 2.9 %
ERYTHROCYTE [DISTWIDTH] IN BLOOD BY AUTOMATED COUNT: 14.1 % (ref 11–15)
GLUCOSE BLD-MCNC: 95 MG/DL (ref 70–99)
HCT VFR BLD AUTO: 29.9 % (ref 39–53)
HGB BLD-MCNC: 9.8 G/DL (ref 13–17.5)
IMM GRANULOCYTES # BLD AUTO: 0.04 X10(3) UL (ref 0–1)
IMM GRANULOCYTES NFR BLD: 0.4 %
LYMPHOCYTES # BLD AUTO: 1.35 X10(3) UL (ref 1–4)
LYMPHOCYTES NFR BLD AUTO: 13.9 %
MCH RBC QN AUTO: 30.5 PG (ref 26–34)
MCHC RBC AUTO-ENTMCNC: 32.8 G/DL (ref 31–37)
MCV RBC AUTO: 93.1 FL (ref 80–100)
MONOCYTES # BLD AUTO: 0.84 X10(3) UL (ref 0.1–1)
MONOCYTES NFR BLD AUTO: 8.7 %
NEUTROPHILS # BLD AUTO: 7.09 X10 (3) UL (ref 1.5–7.7)
NEUTROPHILS # BLD AUTO: 7.09 X10(3) UL (ref 1.5–7.7)
NEUTROPHILS NFR BLD AUTO: 73.3 %
OSMOLALITY SERPL CALC.SUM OF ELEC: 310 MOSM/KG (ref 275–295)
PHOSPHATE SERPL-MCNC: 2.8 MG/DL (ref 2.5–4.9)
PLATELET # BLD AUTO: 283 10(3)UL (ref 150–450)
POTASSIUM SERPL-SCNC: 4 MMOL/L (ref 3.5–5.1)
RBC # BLD AUTO: 3.21 X10(6)UL (ref 3.8–5.8)
SODIUM SERPL-SCNC: 145 MMOL/L (ref 136–145)
WBC # BLD AUTO: 9.7 X10(3) UL (ref 4–11)

## 2019-03-09 RX ORDER — FINASTERIDE 5 MG/1
5 TABLET, FILM COATED ORAL DAILY
Qty: 30 TABLET | Refills: 0 | Status: SHIPPED | OUTPATIENT
Start: 2019-03-10

## 2019-03-09 RX ORDER — ALFUZOSIN HYDROCHLORIDE 10 MG/1
10 TABLET, EXTENDED RELEASE ORAL NIGHTLY
Qty: 30 TABLET | Refills: 0 | Status: SHIPPED | OUTPATIENT
Start: 2019-03-09

## 2019-03-09 RX ORDER — 0.9 % SODIUM CHLORIDE 0.9 %
VIAL (ML) INJECTION
Status: DISCONTINUED
Start: 2019-03-09 | End: 2019-03-09

## 2019-03-09 RX ORDER — CARVEDILOL 6.25 MG/1
6.25 TABLET ORAL 2 TIMES DAILY WITH MEALS
Qty: 60 TABLET | Refills: 0 | Status: SHIPPED | OUTPATIENT
Start: 2019-03-09

## 2019-03-09 RX ORDER — PANTOPRAZOLE SODIUM 40 MG/1
40 TABLET, DELAYED RELEASE ORAL
Qty: 30 TABLET | Refills: 0 | Status: SHIPPED | OUTPATIENT
Start: 2019-03-09

## 2019-03-09 RX ORDER — AMLODIPINE BESYLATE 10 MG/1
10 TABLET ORAL DAILY
Qty: 30 TABLET | Refills: 0 | Status: SHIPPED | OUTPATIENT
Start: 2019-03-10

## 2019-03-09 RX ORDER — SENNA AND DOCUSATE SODIUM 50; 8.6 MG/1; MG/1
2 TABLET, FILM COATED ORAL
Qty: 60 TABLET | Refills: 0 | Status: SHIPPED | OUTPATIENT
Start: 2019-03-09

## 2019-03-09 NOTE — PROGRESS NOTES
[facility]    Progress Note    Magdalena Ayala Patient Status:  Inpatient    1945 MRN [id number]   Location [facility] 3W/SW Attending Karis Flanagan MD   Hosp Day # 5 PCP Bhavik Hill       Subjective:   Travis Cortez 12.5*  9.7   PLT  290.0  283.0  283.0         Recent Labs   Lab  03/07/19   0436  03/08/19   0424  03/09/19   0604   GLU  104*  93  95   BUN  56*  43*  40*   CREATSERUM  4.59*  3.71*  3.34*   GFRAA  14*  17*  20*   GFRNAA  12*  15*  17*   CA  9.6  8.4*  8.

## 2019-03-09 NOTE — PHYSICAL THERAPY NOTE
PHYSICAL THERAPY EVALUATION - INPATIENT     Room Number: 910/187-X  Evaluation Date: 3/8/2019  Type of Evaluation: Initial   Physician Order: PT Eval and Treat    Presenting Problem: obstructive uropathy, HTN  Reason for Therapy: Mobility Dysfunction and training;Stair training;Balance training;Patient education; Family education  Rehab Potential : Good  Frequency (Obs): 5x/week       PHYSICAL THERAPY MEDICAL/SOCIAL HISTORY     History related to current admission: Patient presented to hospital w/complaints throughout session on room air            AM-PAC '6-Clicks' INPATIENT SHORT FORM - BASIC MOBILITY  How much difficulty does the patient currently have. ..  -   Turning over in bed (including adjusting bedclothes, sheets and blankets)?: None   -   Sitting do ambulate 300 feet with assist device: none at assistance level: modified independent   Goal #3   Current Status    Goal #4 Patient will negotiate one curb w/ assistive device and supervision   Goal #4   Current Status    Goal #5 Patient to demonstrate inde

## 2019-03-09 NOTE — PLAN OF CARE
Altered Communication/Language Barrier    • Patient/Family is able to understand and participate in their care Adequate for Discharge        CARDIOVASCULAR - ADULT    • Maintains optimal cardiac output and hemodynamic stability Adequate for Discharge

## 2019-03-10 NOTE — DISCHARGE SUMMARY
[place] [facility]OSPITAL[facility]  DISCHARGE SUMMARY     Flores Schmid Patient Status:  Inpatient    1945 MRN [id number]   Location [facility] 3W/SW Attending No att. providers found   Hosp Day # 5 [place] Denise Jeramy     Date of Admission: 3/4/2019 coreg     Constipation  -added stool softeners     H/O ETOH use  No withdrawal     VTE P: heparin sq-->hold now bc hematuria     Dispo: home with [facility], f/u  on 3/11 and renal in 2 weeks (Dr. Araceli Vick)  D/w pt and wife at bedside          Discharge Medicatio prescriptions at the location directed by your doctor or nurse    Bring a paper prescription for each of these medications  · Alfuzosin HCl ER 10 MG Tb24  · amLODIPine Besylate 10 MG Tabs  · carvedilol 6.25 MG Tabs  · finasteride 5 MG Tabs  · Pantoprazole

## 2019-03-11 LAB — PTH RELATED PEPTIDE: 3.8 PMOL/L

## 2019-03-11 NOTE — PAYOR COMM NOTE
--------------  DISCHARGE REVIEW    Payor: MEDICARE ADVANTAGE PROGRAM (GENERAL)  Subscriber #:  [id number]  Authorization Number: [id number]    Admit date: 3/4/19  Admit time:  7349  Discharge Date: 3/9/2019 11:50 AM     Admitting Physician: Hebert Borrero for further management.     Brief Synopsis:   Acute obstructive uropathy with acute renal failure  Prostatomegaly  Hematuria, now resolved   -IVF per renal  -trend renal fx which is improving  -Cont [place]   -PSA high - s/p cystoscopy, right retrograde pyelo medications      Instructions Prescription details   MULTI-VITAMIN/MINERALS Tabs      Take 1 tablet by mouth daily. Refills:  0     simvastatin 10 MG Tabs  Commonly known as:  ZOCOR      Take 10 mg by mouth nightly.  Monday thru Friday   Refills:  0

## 2019-03-14 ENCOUNTER — LAB ENCOUNTER (OUTPATIENT)
Dept: LAB | Facility: HOSPITAL | Age: 74
End: 2019-03-14
Attending: INTERNAL MEDICINE
Payer: MEDICARE

## 2019-03-14 DIAGNOSIS — D29.1 BENIGN NEOPLASM OF PROSTATE: Primary | ICD-10-CM

## 2019-03-14 DIAGNOSIS — M06.9 ATROPHIC ARTHRITIS (HCC): ICD-10-CM

## 2019-03-14 DIAGNOSIS — N17.9 ACUTE KIDNEY FAILURE, UNSPECIFIED (HCC): ICD-10-CM

## 2019-03-14 DIAGNOSIS — E78.5 HYPERLIPEMIA: ICD-10-CM

## 2019-03-14 DIAGNOSIS — E66.9 OBESITY, UNSPECIFIED: ICD-10-CM

## 2019-03-14 LAB
ALBUMIN SERPL-MCNC: 3.2 G/DL (ref 3.4–5)
ALP LIVER SERPL-CCNC: 66 U/L (ref 45–117)
ALT SERPL-CCNC: 23 U/L (ref 16–61)
ANION GAP SERPL CALC-SCNC: 7 MMOL/L (ref 0–18)
AST SERPL-CCNC: 16 U/L (ref 15–37)
BASOPHILS # BLD AUTO: 0.1 X10(3) UL (ref 0–0.2)
BASOPHILS NFR BLD AUTO: 1.2 %
BILIRUB DIRECT SERPL-MCNC: <0.1 MG/DL (ref 0–0.2)
BILIRUB SERPL-MCNC: 0.3 MG/DL (ref 0.1–2)
BILIRUB UR QL: NEGATIVE
BUN BLD-MCNC: 31 MG/DL (ref 7–18)
BUN/CREAT SERPL: 13.2 (ref 10–20)
C3 SERPL-MCNC: 108 MG/DL (ref 90–180)
C4 SERPL-MCNC: 22.3 MG/DL (ref 10–40)
CALCIUM BLD-MCNC: 7.1 MG/DL (ref 8.5–10.1)
CHLORIDE SERPL-SCNC: 112 MMOL/L (ref 98–107)
CHOLEST SMN-MCNC: 154 MG/DL (ref ?–200)
CK SERPL-CCNC: 74 U/L (ref 39–308)
CLARITY UR: CLEAR
CO2 SERPL-SCNC: 22 MMOL/L (ref 21–32)
COLOR UR: YELLOW
CREAT BLD-MCNC: 2.35 MG/DL (ref 0.7–1.3)
CREAT UR-SCNC: 70 MG/DL
CRP SERPL-MCNC: 1.01 MG/DL (ref ?–0.3)
DEPRECATED RDW RBC AUTO: 46.8 FL (ref 35.1–46.3)
EOSINOPHIL # BLD AUTO: 0.24 X10(3) UL (ref 0–0.7)
EOSINOPHIL NFR BLD AUTO: 2.9 %
ERYTHROCYTE [DISTWIDTH] IN BLOOD BY AUTOMATED COUNT: 14.1 % (ref 11–15)
ERYTHROCYTE [SEDIMENTATION RATE] IN BLOOD: 44 MM/HR (ref 0–20)
GLUCOSE BLD-MCNC: 94 MG/DL (ref 70–99)
GLUCOSE UR-MCNC: NEGATIVE MG/DL
HAV IGM SER QL: 2.5 MG/DL (ref 1.6–2.6)
HBV SURFACE AB SER QL: NONREACTIVE
HBV SURFACE AB SERPL IA-ACNC: <3.1 MIU/ML
HCT VFR BLD AUTO: 29.8 % (ref 39–53)
HDLC SERPL-MCNC: 62 MG/DL (ref 40–59)
HGB BLD-MCNC: 9.8 G/DL (ref 13–17.5)
IMM GRANULOCYTES # BLD AUTO: 0.03 X10(3) UL (ref 0–1)
IMM GRANULOCYTES NFR BLD: 0.4 %
KETONES UR-MCNC: NEGATIVE MG/DL
LDLC SERPL CALC-MCNC: 73 MG/DL (ref ?–100)
LYMPHOCYTES # BLD AUTO: 1.35 X10(3) UL (ref 1–4)
LYMPHOCYTES NFR BLD AUTO: 16.1 %
M PROTEIN MFR SERPL ELPH: 7.1 G/DL (ref 6.4–8.2)
MCH RBC QN AUTO: 30.2 PG (ref 26–34)
MCHC RBC AUTO-ENTMCNC: 32.9 G/DL (ref 31–37)
MCV RBC AUTO: 91.7 FL (ref 80–100)
MICROALBUMIN UR-MCNC: 61.3 MG/DL
MICROALBUMIN/CREAT 24H UR-RTO: 875.7 UG/MG (ref ?–30)
MONOCYTES # BLD AUTO: 0.82 X10(3) UL (ref 0.1–1)
MONOCYTES NFR BLD AUTO: 9.8 %
NEUTROPHILS # BLD AUTO: 5.84 X10 (3) UL (ref 1.5–7.7)
NEUTROPHILS # BLD AUTO: 5.84 X10(3) UL (ref 1.5–7.7)
NEUTROPHILS NFR BLD AUTO: 69.6 %
NITRITE UR QL STRIP.AUTO: POSITIVE
NONHDLC SERPL-MCNC: 92 MG/DL (ref ?–130)
NT-PROBNP SERPL-MCNC: 411 PG/ML (ref ?–125)
OSMOLALITY SERPL CALC.SUM OF ELEC: 298 MOSM/KG (ref 275–295)
PH UR: 6 [PH] (ref 5–8)
PHOSPHATE SERPL-MCNC: 2.1 MG/DL (ref 2.5–4.9)
PLATELET # BLD AUTO: 335 10(3)UL (ref 150–450)
POTASSIUM SERPL-SCNC: 4.4 MMOL/L (ref 3.5–5.1)
PROT UR-MCNC: 100 MG/DL
PROT UR-MCNC: 115.9 MG/DL
PTH-INTACT SERPL-MCNC: 438.4 PG/ML (ref 18.5–88)
RBC # BLD AUTO: 3.25 X10(6)UL (ref 3.8–5.8)
RBC #/AREA URNS AUTO: 6 /HPF
RBC #/AREA URNS AUTO: 8 /HPF
SODIUM SERPL-SCNC: 141 MMOL/L (ref 136–145)
SP GR UR STRIP: 1.01 (ref 1–1.03)
TRIGL SERPL-MCNC: 93 MG/DL (ref 30–149)
TSI SER-ACNC: 3.78 MIU/ML (ref 0.36–3.74)
URATE SERPL-MCNC: 5 MG/DL (ref 3.5–7.2)
UROBILINOGEN UR STRIP-ACNC: <2
VIT C UR-MCNC: NEGATIVE MG/DL
VLDLC SERPL CALC-MCNC: 19 MG/DL (ref 0–30)
WBC # BLD AUTO: 8.4 X10(3) UL (ref 4–11)
WBC #/AREA URNS AUTO: 26 /HPF
WBC #/AREA URNS AUTO: 32 /HPF

## 2019-03-14 PROCEDURE — 86335 IMMUNFIX E-PHORSIS/URINE/CSF: CPT

## 2019-03-14 PROCEDURE — 85652 RBC SED RATE AUTOMATED: CPT

## 2019-03-14 PROCEDURE — 83735 ASSAY OF MAGNESIUM: CPT

## 2019-03-14 PROCEDURE — 80048 BASIC METABOLIC PNL TOTAL CA: CPT

## 2019-03-14 PROCEDURE — 83883 ASSAY NEPHELOMETRY NOT SPEC: CPT

## 2019-03-14 PROCEDURE — 82043 UR ALBUMIN QUANTITATIVE: CPT

## 2019-03-14 PROCEDURE — 86255 FLUORESCENT ANTIBODY SCREEN: CPT

## 2019-03-14 PROCEDURE — 84166 PROTEIN E-PHORESIS/URINE/CSF: CPT

## 2019-03-14 PROCEDURE — 36415 COLL VENOUS BLD VENIPUNCTURE: CPT

## 2019-03-14 PROCEDURE — 82306 VITAMIN D 25 HYDROXY: CPT

## 2019-03-14 PROCEDURE — 86160 COMPLEMENT ANTIGEN: CPT

## 2019-03-14 PROCEDURE — 86162 COMPLEMENT TOTAL (CH50): CPT

## 2019-03-14 PROCEDURE — 83880 ASSAY OF NATRIURETIC PEPTIDE: CPT

## 2019-03-14 PROCEDURE — 83970 ASSAY OF PARATHORMONE: CPT

## 2019-03-14 PROCEDURE — 84100 ASSAY OF PHOSPHORUS: CPT

## 2019-03-14 PROCEDURE — 86225 DNA ANTIBODY NATIVE: CPT

## 2019-03-14 PROCEDURE — 86706 HEP B SURFACE ANTIBODY: CPT

## 2019-03-14 PROCEDURE — 80076 HEPATIC FUNCTION PANEL: CPT

## 2019-03-14 PROCEDURE — 80061 LIPID PANEL: CPT

## 2019-03-14 PROCEDURE — 85025 COMPLETE CBC W/AUTO DIFF WBC: CPT

## 2019-03-14 PROCEDURE — 81001 URINALYSIS AUTO W/SCOPE: CPT

## 2019-03-14 PROCEDURE — 84165 PROTEIN E-PHORESIS SERUM: CPT

## 2019-03-14 PROCEDURE — 82550 ASSAY OF CK (CPK): CPT

## 2019-03-14 PROCEDURE — 86334 IMMUNOFIX E-PHORESIS SERUM: CPT

## 2019-03-14 PROCEDURE — 86140 C-REACTIVE PROTEIN: CPT

## 2019-03-14 PROCEDURE — 84550 ASSAY OF BLOOD/URIC ACID: CPT

## 2019-03-14 PROCEDURE — 82570 ASSAY OF URINE CREATININE: CPT

## 2019-03-14 PROCEDURE — 84443 ASSAY THYROID STIM HORMONE: CPT

## 2019-03-15 LAB
25(OH)D3 SERPL-MCNC: 45.2 NG/ML (ref 30–100)
DSDNA AB TITR SER: <10 {TITER}

## 2019-03-16 LAB — COMPLEMENT ACTIVITY, TOTAL EIA: 203 CAE UNITS

## 2019-03-18 LAB
ALBUMIN SERPL ELPH-MCNC: 3.46 G/DL (ref 3.75–5.21)
ALBUMIN/GLOB SERPL: 1.1 {RATIO} (ref 1–2)
ALPHA1 GLOB SERPL ELPH-MCNC: 0.45 G/DL (ref 0.19–0.46)
ALPHA2 GLOB SERPL ELPH-MCNC: 0.88 G/DL (ref 0.48–1.05)
B-GLOBULIN SERPL ELPH-MCNC: 0.61 G/DL (ref 0.68–1.23)
GAMMA GLOB SERPL ELPH-MCNC: 1.2 G/DL (ref 0.62–1.7)
KAPPA FREE LIGHT CHAIN: 6.22 MG/DL (ref 0.33–1.94)
KAPPA/LAMBDA FLC RATIO: 1.89 (ref 0.26–1.65)
LAMBDA FREE LIGHT CHAIN: 3.29 MG/DL (ref 0.57–2.63)
TOTAL PROTEIN (SPECIAL TESTING): 6.6 G/DL (ref 6.5–9.1)

## (undated) DEVICE — CATH URET CONE TIP 8FR 138008

## (undated) DEVICE — CYSTO PACK: Brand: MEDLINE INDUSTRIES, INC.

## (undated) DEVICE — SOL H2O 1000ML BTL

## (undated) DEVICE — CATH URTH BDX IC 20FR FL 3

## (undated) DEVICE — NON-ADHERENT PADS PREPACK: Brand: TELFA

## (undated) DEVICE — CATH SECURING DEVICE STATLOCK

## (undated) DEVICE — SOL H2O 3000ML IRRIG

## (undated) DEVICE — ISOVUE 300 10X100ML VIAL

## (undated) DEVICE — ENCORE® LATEX ACCLAIM SIZE 8, STERILE LATEX POWDER-FREE SURGICAL GLOVE: Brand: ENCORE

## (undated) NOTE — LETTER
[facility]  Administration of Anesthesia  1.  Lilo Menendez, or _________________________________ acting on his behalf, (Patient) (Dependent/Representative) request to receive anesthesia for my pending procedure/operation/treatment infections, high spinal block, spinal bleeding, seizure, cardiac arrest and death. 7. AWARENESS: I understand that it is possible (but unlikely) to have explicit memory of events from the operating room while under general anesthesia.   8. ELECTROCONVULSIV unconscious pt /Relationship    My signature below affirms that prior to the time of the procedure, I have explained to the patient and/or his/her guardian, the risks and benefits of undergoing anesthesia, as well as any reasonable alternatives.     _______

## (undated) NOTE — LETTER
Joe Espinosa [phone], [facility], [facility]  [id number]  INFORMED CONSENT FOR TRANSFUSION OF BLOOD OR BLOOD PRODUCTS  My physician has informed me of the nature, purpose, benefits and risks of transfusion for blood and blood components that ______________________________________________  (Signature of Patient)                                                            (Responsible party in case of Minor,

## (undated) NOTE — LETTER
[address] [place] [address] Rd, [place], [place]     AUTHORIZATION FOR SURGICAL OPERATION OR PROCEDURE    I hereby authorize Dr. Angelia Jimenez MD, my Physician(s) and whomever may be designated as the doctor's Assistant, to perform the followi 4. I consent to the photographing of procedure(s) to be performed for the purposes of advancing medicine, science and/or education, provided my identity is not revealed.  If the procedure has been videotaped, the physician/surgeon will obtain the original v (Witness signature)                                                                                                  (Date)                                (Time)  STATEMENT OF PHYSICIAN My signature below affirms that prior to the time of the procedure;  I